# Patient Record
Sex: MALE | Race: WHITE | Employment: FULL TIME | URBAN - METROPOLITAN AREA
[De-identification: names, ages, dates, MRNs, and addresses within clinical notes are randomized per-mention and may not be internally consistent; named-entity substitution may affect disease eponyms.]

---

## 2019-01-22 ENCOUNTER — HOSPITAL ENCOUNTER (EMERGENCY)
Facility: HOSPITAL | Age: 74
Discharge: HOME | End: 2019-01-22
Attending: EMERGENCY MEDICINE
Payer: OTHER MISCELLANEOUS

## 2019-01-22 VITALS
OXYGEN SATURATION: 96 % | HEART RATE: 56 BPM | SYSTOLIC BLOOD PRESSURE: 138 MMHG | DIASTOLIC BLOOD PRESSURE: 73 MMHG | RESPIRATION RATE: 16 BRPM | TEMPERATURE: 98 F

## 2019-01-22 DIAGNOSIS — H57.12 PAIN OF LEFT EYE: Primary | ICD-10-CM

## 2019-01-22 PROCEDURE — 99283 EMERGENCY DEPT VISIT LOW MDM: CPT

## 2019-01-22 RX ORDER — TETRACAINE HYDROCHLORIDE 5 MG/ML
1 SOLUTION OPHTHALMIC ONCE
Status: COMPLETED | OUTPATIENT
Start: 2019-01-22 | End: 2019-01-22

## 2019-01-22 NOTE — ED PROVIDER NOTES
Patient Seen in: [facility] AND [facility] Emergency Department    History   Patient presents with: Eye Visual Problem (opthalmic)      HPI    Patient presents to the ED with ongoing foreign body sensation in his left eye.   He states that at work this evening h or ulcer on slit-lamp examination. ,  No foreign body with lid eversion. Normal vision. Pulmonary/Chest: Effort normal. No respiratory distress. Neurological: He is alert and oriented to person, place, and time. Skin: Skin is warm and dry.    Psychiat diagnosis)    Disposition:  Discharge    Follow-up:  Your eye doctor    Call in 1 day        Medications Prescribed:  There are no discharge medications for this patient.

## (undated) NOTE — ED AVS SNAPSHOT
Jalen Lane   MRN: [id number]    Department:  [facility] Emergency Department   Date of Visit:  1/22/2019           Disclosure     Insurance plans vary and the physician(s) referred by the ER may not be covered by your plan.  Please contact within the next three months to obtain basic health screening including reassessment of your blood pressure.     IF THERE IS ANY CHANGE OR WORSENING OF YOUR CONDITION, CALL YOUR PRIMARY CARE PHYSICIAN AT ONCE OR RETURN IMMEDIATELY TO THE EMERGENCY DEPARTMEN